# Patient Record
Sex: MALE | Race: WHITE | NOT HISPANIC OR LATINO | URBAN - METROPOLITAN AREA
[De-identification: names, ages, dates, MRNs, and addresses within clinical notes are randomized per-mention and may not be internally consistent; named-entity substitution may affect disease eponyms.]

---

## 2023-04-27 ENCOUNTER — APPOINTMENT (RX ONLY)
Dept: URBAN - METROPOLITAN AREA CLINIC 281 | Facility: CLINIC | Age: 28
Setting detail: DERMATOLOGY
End: 2023-04-27

## 2023-04-27 DIAGNOSIS — L72.0 EPIDERMAL CYST: ICD-10-CM

## 2023-04-27 DIAGNOSIS — B07.8 OTHER VIRAL WARTS: ICD-10-CM

## 2023-04-27 PROBLEM — D23.39 OTHER BENIGN NEOPLASM OF SKIN OF OTHER PARTS OF FACE: Status: ACTIVE | Noted: 2023-04-27

## 2023-04-27 PROCEDURE — 99202 OFFICE O/P NEW SF 15 MIN: CPT | Mod: 25

## 2023-04-27 PROCEDURE — 11900 INJECT SKIN LESIONS </W 7: CPT

## 2023-04-27 PROCEDURE — ? COUNSELING

## 2023-04-27 PROCEDURE — ? IMMUNOTHERAPY: CANDIDA ANTIGEN

## 2023-04-27 ASSESSMENT — LOCATION SIMPLE DESCRIPTION DERM
LOCATION SIMPLE: PLANTAR SURFACE OF LEFT 1ST TOE
LOCATION SIMPLE: LEFT CHEEK
LOCATION SIMPLE: RIGHT CHEEK

## 2023-04-27 ASSESSMENT — LOCATION ZONE DERM
LOCATION ZONE: TOE
LOCATION ZONE: FACE

## 2023-04-27 ASSESSMENT — LOCATION DETAILED DESCRIPTION DERM
LOCATION DETAILED: LEFT SUPERIOR CENTRAL MALAR CHEEK
LOCATION DETAILED: RIGHT SUPERIOR CENTRAL MALAR CHEEK
LOCATION DETAILED: LEFT LATERAL PLANTAR 1ST TOE

## 2023-04-27 NOTE — PROCEDURE: IMMUNOTHERAPY: CANDIDA ANTIGEN
Total Amount Injected In Ccs: 0.4
Strength: 0.1cc Candida antigen in 0.3cc sterile water
J-Code Units: 1
Bill ?: Yes
Detail Level: Detailed
Post-Care Instructions: I reviewed with the patient in detail post-care instructions. Mild to moderate itching, tenderness, or swelling at the injection site is common and usually lasts 24 to 48 hours. The patient may elevate the painful area, apply ice for 5 minutes at a time, take tylenol every 4 to 6 hours. Only 5% of Candida immunotherapy patients get flu-like symptoms. This usually lasts only 24 to 48 hours. It is possible to prevent this at future visits by taking tylenol before the injection. If warts are on the fingers, all rings should be removed for 2 days post treatment. The patient understands that multiple treatments may be needed and there is no gaurantee of improvement.
Consent: Verbal Consent was obtained from the patient. The risks of candida antigen injection were discussed in detail. Specifically, the risks of redness, itching, pain and allergic reactions were addressed. Prior to injection all of the patient's questions were answered.

## 2023-05-10 ENCOUNTER — APPOINTMENT (RX ONLY)
Dept: URBAN - METROPOLITAN AREA CLINIC 281 | Facility: CLINIC | Age: 28
Setting detail: DERMATOLOGY
End: 2023-05-10

## 2023-05-10 DIAGNOSIS — B07.8 OTHER VIRAL WARTS: ICD-10-CM

## 2023-05-10 PROCEDURE — 11900 INJECT SKIN LESIONS </W 7: CPT

## 2023-05-10 PROCEDURE — ? COUNSELING

## 2023-05-10 PROCEDURE — ? IMMUNOTHERAPY: CANDIDA ANTIGEN

## 2023-05-10 ASSESSMENT — LOCATION SIMPLE DESCRIPTION DERM: LOCATION SIMPLE: PLANTAR SURFACE OF LEFT 1ST TOE

## 2023-05-10 ASSESSMENT — LOCATION DETAILED DESCRIPTION DERM: LOCATION DETAILED: LEFT LATERAL PLANTAR 1ST TOE

## 2023-05-10 ASSESSMENT — LOCATION ZONE DERM: LOCATION ZONE: TOE

## 2023-05-10 NOTE — PROCEDURE: IMMUNOTHERAPY: CANDIDA ANTIGEN
Total Amount Injected In Ccs: 0.4
Strength: 0.1cc Candida antigen in 0.3cc sterile water
J-Code Units: 1
Bill ?: Yes
Detail Level: Detailed
Post-Care Instructions: I reviewed with the patient in detail post-care instructions. Mild to moderate itching, tenderness, or swelling at the injection site is common and usually lasts 24 to 48 hours. The patient may elevate the painful area, apply ice for 5 minutes at a time, take tylenol every 4 to 6 hours. Only 5% of Candida immunotherapy patients get flu-like symptoms. This usually lasts only 24 to 48 hours. It is possible to prevent this at future visits by taking tylenol before the injection. If warts are on the fingers, all rings should be removed for 2 days post treatment. The patient understands that multiple treatments may be needed and there is no gaurantee of improvement.
Treatment #: 2
Consent: Verbal Consent was obtained from the patient. The risks of candida antigen injection were discussed in detail. Specifically, the risks of redness, itching, pain and allergic reactions were addressed. Prior to injection all of the patient's questions were answered.

## 2023-05-23 ENCOUNTER — APPOINTMENT (RX ONLY)
Dept: URBAN - METROPOLITAN AREA CLINIC 281 | Facility: CLINIC | Age: 28
Setting detail: DERMATOLOGY
End: 2023-05-23

## 2023-05-23 DIAGNOSIS — B07.8 OTHER VIRAL WARTS: ICD-10-CM

## 2023-05-23 PROCEDURE — ? IMMUNOTHERAPY: CANDIDA ANTIGEN

## 2023-05-23 PROCEDURE — 11900 INJECT SKIN LESIONS </W 7: CPT

## 2023-05-23 PROCEDURE — ? COUNSELING

## 2023-05-23 ASSESSMENT — LOCATION ZONE DERM: LOCATION ZONE: TOE

## 2023-05-23 ASSESSMENT — LOCATION DETAILED DESCRIPTION DERM: LOCATION DETAILED: LEFT LATERAL PLANTAR 1ST TOE

## 2023-05-23 ASSESSMENT — LOCATION SIMPLE DESCRIPTION DERM: LOCATION SIMPLE: PLANTAR SURFACE OF LEFT 1ST TOE

## 2023-05-23 NOTE — PROCEDURE: IMMUNOTHERAPY: CANDIDA ANTIGEN
Total Amount Injected In Ccs: 0.4
Strength: 0.1cc Candida antigen in 0.3cc sterile water
J-Code Units: 1
Bill ?: Yes
Detail Level: Detailed
Post-Care Instructions: I reviewed with the patient in detail post-care instructions. Mild to moderate itching, tenderness, or swelling at the injection site is common and usually lasts 24 to 48 hours. The patient may elevate the painful area, apply ice for 5 minutes at a time, take tylenol every 4 to 6 hours. Only 5% of Candida immunotherapy patients get flu-like symptoms. This usually lasts only 24 to 48 hours. It is possible to prevent this at future visits by taking tylenol before the injection. If warts are on the fingers, all rings should be removed for 2 days post treatment. The patient understands that multiple treatments may be needed and there is no gaurantee of improvement.
Treatment #: 3
Consent: Verbal Consent was obtained from the patient. The risks of candida antigen injection were discussed in detail. Specifically, the risks of redness, itching, pain and allergic reactions were addressed. Prior to injection all of the patient's questions were answered.

## 2023-06-06 ENCOUNTER — APPOINTMENT (RX ONLY)
Dept: URBAN - METROPOLITAN AREA CLINIC 281 | Facility: CLINIC | Age: 28
Setting detail: DERMATOLOGY
End: 2023-06-06

## 2023-06-06 DIAGNOSIS — B07.8 OTHER VIRAL WARTS: ICD-10-CM

## 2023-06-06 PROCEDURE — ? IMMUNOTHERAPY: CANDIDA ANTIGEN

## 2023-06-06 PROCEDURE — 11900 INJECT SKIN LESIONS </W 7: CPT

## 2023-06-06 PROCEDURE — ? COUNSELING

## 2023-06-06 PROCEDURE — ? ADDITIONAL NOTES

## 2023-06-06 ASSESSMENT — LOCATION ZONE DERM
LOCATION ZONE: FEET
LOCATION ZONE: TOE

## 2023-06-06 ASSESSMENT — LOCATION DETAILED DESCRIPTION DERM
LOCATION DETAILED: LEFT PLANTAR FOREFOOT OVERLYING 1ST METATARSAL
LOCATION DETAILED: LEFT LATERAL PLANTAR 1ST TOE

## 2023-06-06 ASSESSMENT — LOCATION SIMPLE DESCRIPTION DERM
LOCATION SIMPLE: LEFT PLANTAR SURFACE
LOCATION SIMPLE: PLANTAR SURFACE OF LEFT 1ST TOE

## 2023-06-06 NOTE — PROCEDURE: IMMUNOTHERAPY: CANDIDA ANTIGEN
Total Amount Injected In Ccs: 0.4
Strength: 0.1cc Candida antigen in 0.3cc sterile water
J-Code Units: 1
Bill ?: Yes
Detail Level: Detailed
Post-Care Instructions: I reviewed with the patient in detail post-care instructions. Mild to moderate itching, tenderness, or swelling at the injection site is common and usually lasts 24 to 48 hours. The patient may elevate the painful area, apply ice for 5 minutes at a time, take tylenol every 4 to 6 hours. Only 5% of Candida immunotherapy patients get flu-like symptoms. This usually lasts only 24 to 48 hours. It is possible to prevent this at future visits by taking tylenol before the injection. If warts are on the fingers, all rings should be removed for 2 days post treatment. The patient understands that multiple treatments may be needed and there is no gaurantee of improvement.
Treatment #: 4
Consent: Verbal Consent was obtained from the patient. The risks of candida antigen injection were discussed in detail. Specifically, the risks of redness, itching, pain and allergic reactions were addressed. Prior to injection all of the patient's questions were answered.

## 2023-06-06 NOTE — PROCEDURE: ADDITIONAL NOTES
Additional Notes: Pt is applying otc topical wart treatment
Detail Level: Zone
Render Risk Assessment In Note?: no

## 2023-06-20 ENCOUNTER — APPOINTMENT (RX ONLY)
Dept: URBAN - METROPOLITAN AREA CLINIC 281 | Facility: CLINIC | Age: 28
Setting detail: DERMATOLOGY
End: 2023-06-20

## 2023-06-20 DIAGNOSIS — B07.8 OTHER VIRAL WARTS: ICD-10-CM

## 2023-06-20 PROCEDURE — ? IMMUNOTHERAPY: CANDIDA ANTIGEN

## 2023-06-20 PROCEDURE — ? COUNSELING

## 2023-06-20 PROCEDURE — 11900 INJECT SKIN LESIONS </W 7: CPT

## 2023-06-20 PROCEDURE — ? ADDITIONAL NOTES

## 2023-06-20 ASSESSMENT — LOCATION SIMPLE DESCRIPTION DERM
LOCATION SIMPLE: LEFT PLANTAR SURFACE
LOCATION SIMPLE: PLANTAR SURFACE OF LEFT 1ST TOE

## 2023-06-20 ASSESSMENT — LOCATION ZONE DERM
LOCATION ZONE: FEET
LOCATION ZONE: TOE

## 2023-06-20 NOTE — PROCEDURE: IMMUNOTHERAPY: CANDIDA ANTIGEN
Total Amount Injected In Ccs: 0.4
Strength: 0.1cc Candida antigen in 0.3cc sterile water
J-Code Units: 1
Bill ?: Yes
Detail Level: Detailed
Post-Care Instructions: I reviewed with the patient in detail post-care instructions. Mild to moderate itching, tenderness, or swelling at the injection site is common and usually lasts 24 to 48 hours. The patient may elevate the painful area, apply ice for 5 minutes at a time, take tylenol every 4 to 6 hours. Only 5% of Candida immunotherapy patients get flu-like symptoms. This usually lasts only 24 to 48 hours. It is possible to prevent this at future visits by taking tylenol before the injection. If warts are on the fingers, all rings should be removed for 2 days post treatment. The patient understands that multiple treatments may be needed and there is no gaurantee of improvement.
Treatment #: 5
Consent: Verbal Consent was obtained from the patient. The risks of candida antigen injection were discussed in detail. Specifically, the risks of redness, itching, pain and allergic reactions were addressed. Prior to injection all of the patient's questions were answered.